# Patient Record
Sex: FEMALE | Race: WHITE | URBAN - METROPOLITAN AREA
[De-identification: names, ages, dates, MRNs, and addresses within clinical notes are randomized per-mention and may not be internally consistent; named-entity substitution may affect disease eponyms.]

---

## 2023-10-02 PROBLEM — 313436004: Status: ACTIVE | Noted: 2023-10-02

## 2023-10-03 ENCOUNTER — LAB OUTSIDE AN ENCOUNTER (OUTPATIENT)
Dept: URBAN - METROPOLITAN AREA CLINIC 74 | Facility: CLINIC | Age: 58
End: 2023-10-03

## 2023-10-03 ENCOUNTER — WEB ENCOUNTER (OUTPATIENT)
Dept: URBAN - METROPOLITAN AREA CLINIC 74 | Facility: CLINIC | Age: 58
End: 2023-10-03

## 2023-10-03 ENCOUNTER — LAB OUTSIDE AN ENCOUNTER (OUTPATIENT)
Dept: URBAN - METROPOLITAN AREA CLINIC 40 | Facility: CLINIC | Age: 58
End: 2023-10-03

## 2023-10-03 ENCOUNTER — OFFICE VISIT (OUTPATIENT)
Dept: URBAN - METROPOLITAN AREA CLINIC 74 | Facility: CLINIC | Age: 58
End: 2023-10-03
Payer: COMMERCIAL

## 2023-10-03 ENCOUNTER — DASHBOARD ENCOUNTERS (OUTPATIENT)
Age: 58
End: 2023-10-03

## 2023-10-03 VITALS
DIASTOLIC BLOOD PRESSURE: 85 MMHG | BODY MASS INDEX: 47.09 KG/M2 | SYSTOLIC BLOOD PRESSURE: 138 MMHG | HEIGHT: 66 IN | HEART RATE: 86 BPM | TEMPERATURE: 97.2 F | WEIGHT: 293 LBS

## 2023-10-03 DIAGNOSIS — E11.9 TYPE 2 DIABETES MELLITUS WITHOUT COMPLICATION, WITHOUT LONG-TERM CURRENT USE OF INSULIN: ICD-10-CM

## 2023-10-03 DIAGNOSIS — Z87.39 H/O DEGENERATIVE DISC DISEASE: ICD-10-CM

## 2023-10-03 DIAGNOSIS — Z12.11 COLON CANCER SCREENING: ICD-10-CM

## 2023-10-03 DIAGNOSIS — R74.01 TRANSAMINITIS: ICD-10-CM

## 2023-10-03 DIAGNOSIS — L40.9 PSORIASIS: ICD-10-CM

## 2023-10-03 PROBLEM — 9014002: Status: ACTIVE | Noted: 2023-10-03

## 2023-10-03 PROCEDURE — 99204 OFFICE O/P NEW MOD 45 MIN: CPT | Performed by: NURSE PRACTITIONER

## 2023-10-03 NOTE — HPI-TODAY'S VISIT:
58-year-old female patient with past medical history as listed below, new patient, no referral noted in chart.  --ED visit 8/30/2023 [facility] for dizziness.  Diabetic on metformin, hypertension.  Surgical history of cholecystectomy, hysterectomy, tonsillectomy. Chest x-ray showed no acute process, EKG showed left bundle branch block otherwise normal sinus rhythm. CT of the head showed no acute intercranial abnormality.  Labs obtained.  Discharge diagnosis dizziness, work-up unremarkable.  --Presented again to the ED the next day August 31, 2023 for acute bilateral low back pain, muscle spasm, medications at time of discharge, Xanax ,Flexeril, diclofenac, Cymbalta, Norco, metformin and Seroquel.  Acute bilateral low back pain without sciatica.  Labs were normal.  --Labs dated both August 30 and August 31 CMP, CBC, magnesium.  CMP dated 8/31/2023 shows AST 63, ALT 43 EGFR 56 creatinine 1.14 BUN 31 CBC within normal limits.  Patient followed by [facility] as well as [facility].  No abdominal  imaging for review.   -- The patient presents today for elevated liver enzymes. She states he liver enzymes were normal the previous labs. She has lost  25 lbs since starting  metformin 3 months ago. 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 Pastries and coffee are her weakness. She has  2-3 bms a day, the  skip a day or two, then will have to take immodium sometimes, that has been since the metformin. She that is getting better though. She has a rectocele and tries to avoid constipation and is avoiding surgery. Denies family history colon cancer. Denies dark or black stools. Occasional reflux , sporadic, will take tums or a short course of  prilsoec. Discussed lowfodmap diet. Occasional bloating. Has one alcohol beverage a week, no smoking. Takes Miralax , fiber as needed. She has Psoraisis.  She had full cardiac work up in ED was negative. She is following up with cardio , workup coming up for diaphoretic episode,  echo and stress test scheduled for 30th. States labs have always been good. She has never had a colonoscopy. She has had her mammograms.

## 2023-10-06 ENCOUNTER — OFFICE VISIT (OUTPATIENT)
Dept: URBAN - METROPOLITAN AREA CLINIC 39 | Facility: CLINIC | Age: 58
End: 2023-10-06
Payer: COMMERCIAL

## 2023-10-06 DIAGNOSIS — R93.2 ABNORMAL ULTRASOUND OF LIVER: ICD-10-CM

## 2023-10-06 PROCEDURE — 76705 ECHO EXAM OF ABDOMEN: CPT | Performed by: INTERNAL MEDICINE

## 2023-10-09 LAB
ACTIN (SMOOTH MUSCLE) ANTIBODY (IGG): <20
ANA SCREEN, IFA: POSITIVE
CERULOPLASMIN: 34
FERRITIN, SERUM: 120
HBSAG SCREEN: (no result)
HEP A AB, IGM: (no result)
HEP B CORE AB, IGM: (no result)
HEPATITIS C ANTIBODY: (no result)
INTERPRETATION: (no result)
IRON BIND.CAP.(TIBC): 368
IRON SATURATION: 24
IRON: 89
MITOCHONDRIAL (M2) ANTIBODY: <=20
RHEUMATOID FACTOR: <14
SJOGREN'S ANTIBODY (SS-A): (no result)
SJOGREN'S ANTIBODY (SS-B): (no result)

## 2024-01-02 ENCOUNTER — OFFICE VISIT (OUTPATIENT)
Dept: URBAN - METROPOLITAN AREA MEDICAL CENTER 9 | Facility: MEDICAL CENTER | Age: 59
End: 2024-01-02

## 2024-01-29 ENCOUNTER — OFFICE VISIT (OUTPATIENT)
Dept: URBAN - METROPOLITAN AREA CLINIC 74 | Facility: CLINIC | Age: 59
End: 2024-01-29

## 2024-02-20 ENCOUNTER — COLON (OUTPATIENT)
Dept: URBAN - METROPOLITAN AREA MEDICAL CENTER 9 | Facility: MEDICAL CENTER | Age: 59
End: 2024-02-20

## 2024-03-18 ENCOUNTER — OV EP (OUTPATIENT)
Dept: URBAN - METROPOLITAN AREA CLINIC 74 | Facility: CLINIC | Age: 59
End: 2024-03-18